# Patient Record
Sex: MALE | Race: WHITE | NOT HISPANIC OR LATINO | Employment: OTHER | URBAN - METROPOLITAN AREA
[De-identification: names, ages, dates, MRNs, and addresses within clinical notes are randomized per-mention and may not be internally consistent; named-entity substitution may affect disease eponyms.]

---

## 2017-07-31 ENCOUNTER — PREPPED CHART (OUTPATIENT)
Dept: URBAN - METROPOLITAN AREA CLINIC 35 | Facility: CLINIC | Age: 77
End: 2017-07-31

## 2017-09-21 ASSESSMENT — TONOMETRY
OD_IOP_MMHG: 13
OS_IOP_MMHG: 09

## 2017-09-21 ASSESSMENT — VISUAL ACUITY
OD_CC: 20/30-2
OS_CC: J3
OD_CC: J3
OS_CC: 20/30-2

## 2017-09-22 ENCOUNTER — ESTABLISHED PATIENT (OUTPATIENT)
Dept: URBAN - METROPOLITAN AREA CLINIC 35 | Facility: CLINIC | Age: 77
End: 2017-09-22

## 2017-09-22 DIAGNOSIS — H43.813: ICD-10-CM

## 2017-09-22 DIAGNOSIS — E11.9: ICD-10-CM

## 2017-09-22 DIAGNOSIS — H35.3122: ICD-10-CM

## 2017-09-22 DIAGNOSIS — H35.3112: ICD-10-CM

## 2017-09-22 PROCEDURE — 92012 INTRM OPH EXAM EST PATIENT: CPT

## 2017-09-22 PROCEDURE — 92250 FUNDUS PHOTOGRAPHY W/I&R: CPT

## 2017-09-22 PROCEDURE — 3072F LOW RISK FOR RETINOPATHY: CPT

## 2017-09-22 PROCEDURE — G8427 DOCREV CUR MEDS BY ELIG CLIN: HCPCS

## 2017-09-22 PROCEDURE — G8756 NO BP MEASURE DOC: HCPCS

## 2017-09-22 PROCEDURE — 2019F DILATED MACUL EXAM DONE: CPT

## 2017-09-22 PROCEDURE — 9222650 BILAT EXTENDED OPHTHALMOSCOPY, F/U

## 2017-09-22 PROCEDURE — 2022F DILAT RTA XM EVC RTNOPTHY: CPT

## 2017-09-22 PROCEDURE — 92134 CPTRZ OPH DX IMG PST SGM RTA: CPT

## 2017-09-22 PROCEDURE — 4177F TALK PT/CRGVR RE AREDS PREV: CPT

## 2017-09-22 PROCEDURE — 1036F TOBACCO NON-USER: CPT

## 2017-09-22 ASSESSMENT — VISUAL ACUITY
OS_CC: 20/30-2
OD_CC: 20/25+2

## 2017-09-22 ASSESSMENT — TONOMETRY
OD_IOP_MMHG: 13
OS_IOP_MMHG: 12

## 2018-01-11 ENCOUNTER — ESTABLISHED PATIENT (OUTPATIENT)
Dept: URBAN - METROPOLITAN AREA CLINIC 35 | Facility: CLINIC | Age: 78
End: 2018-01-11

## 2018-01-11 DIAGNOSIS — E11.9: ICD-10-CM

## 2018-01-11 DIAGNOSIS — H43.813: ICD-10-CM

## 2018-01-11 DIAGNOSIS — H35.3122: ICD-10-CM

## 2018-01-11 DIAGNOSIS — H35.3211: ICD-10-CM

## 2018-01-11 DIAGNOSIS — H25.013: ICD-10-CM

## 2018-01-11 PROCEDURE — 92226 OPHTHALMOSCOPY (SUB): CPT

## 2018-01-11 PROCEDURE — 1036F TOBACCO NON-USER: CPT

## 2018-01-11 PROCEDURE — 2024F 7 FLD RTA PHOTO EVC RTNOPTHY: CPT

## 2018-01-11 PROCEDURE — G8756 NO BP MEASURE DOC: HCPCS

## 2018-01-11 PROCEDURE — G8428 CUR MEDS NOT DOCUMENT: HCPCS

## 2018-01-11 PROCEDURE — 2019F DILATED MACUL EXAM DONE: CPT

## 2018-01-11 PROCEDURE — 4040F PNEUMOC VAC/ADMIN/RCVD: CPT

## 2018-01-11 PROCEDURE — 92014 COMPRE OPH EXAM EST PT 1/>: CPT

## 2018-01-11 PROCEDURE — G8482 FLU IMMUNIZE ORDER/ADMIN: HCPCS

## 2018-01-11 PROCEDURE — 3072F LOW RISK FOR RETINOPATHY: CPT

## 2018-01-11 PROCEDURE — 2022F DILAT RTA XM EVC RTNOPTHY: CPT

## 2018-01-11 PROCEDURE — 2026F EYE IMG VALID EVC RTNOPTHY: CPT

## 2018-01-11 PROCEDURE — G9744 PT NOT ELI D/T ACT DIG HTN: HCPCS

## 2018-01-11 PROCEDURE — 67028 INJECTION EYE DRUG: CPT

## 2018-01-11 PROCEDURE — 92134 CPTRZ OPH DX IMG PST SGM RTA: CPT

## 2018-01-11 PROCEDURE — 4177F TALK PT/CRGVR RE AREDS PREV: CPT

## 2018-01-11 ASSESSMENT — VISUAL ACUITY
OD_CC: 20/40
OS_CC: 20/40

## 2018-01-11 ASSESSMENT — TONOMETRY
OD_IOP_MMHG: 15
OS_IOP_MMHG: 14

## 2018-02-15 ENCOUNTER — ESTABLISHED PATIENT (OUTPATIENT)
Dept: URBAN - METROPOLITAN AREA CLINIC 35 | Facility: CLINIC | Age: 78
End: 2018-02-15

## 2018-02-15 DIAGNOSIS — H35.722: ICD-10-CM

## 2018-02-15 DIAGNOSIS — H35.3211: ICD-10-CM

## 2018-02-15 DIAGNOSIS — H35.3122: ICD-10-CM

## 2018-02-15 DIAGNOSIS — H35.731: ICD-10-CM

## 2018-02-15 PROCEDURE — G8428 CUR MEDS NOT DOCUMENT: HCPCS

## 2018-02-15 PROCEDURE — 2019F DILATED MACUL EXAM DONE: CPT

## 2018-02-15 PROCEDURE — 4177F TALK PT/CRGVR RE AREDS PREV: CPT

## 2018-02-15 PROCEDURE — 92012 INTRM OPH EXAM EST PATIENT: CPT

## 2018-02-15 PROCEDURE — G8756 NO BP MEASURE DOC: HCPCS

## 2018-02-15 PROCEDURE — 1036F TOBACCO NON-USER: CPT

## 2018-02-15 PROCEDURE — 67028 INJECTION EYE DRUG: CPT

## 2018-02-15 PROCEDURE — 92134 CPTRZ OPH DX IMG PST SGM RTA: CPT

## 2018-02-15 ASSESSMENT — VISUAL ACUITY
OD_CC: 20/60+2
OS_CC: 20/40

## 2018-02-15 ASSESSMENT — TONOMETRY
OS_IOP_MMHG: 12
OD_IOP_MMHG: 15

## 2018-02-16 NOTE — PATIENT DISCUSSION
New Prescription: Lotemax (loteprednol etabonate): ointment: 0.5% a small amount at bedtime into both eyes 02-

## 2018-02-16 NOTE — PATIENT DISCUSSION
K SIC[facility]: PRESCRIBED LOTEMAX MAYCOL QHS  DISAPPEARING PRESERVATIVE OR PRESERVATIVE FREE ARTIFICIAL TEARS BID ? QID4-6X A DAY, OU AND THE DAILY INTAKE OF OMEGA-3 DHA/EPA FATTY ACIDS TO HELP RELIEVE SYMPTOMS. ADD NIGHTLY LUBRICATING OINTMENT OR GEL. CONTINUE  RESTASIS BID . CONSIDER PUNCTAL PLUGS  NEXT VISIT  RETURN FOR FOLLOW-UP AS SCHEDULED OR SOONER IF SYMPTOMS WORSEN.

## 2018-03-15 ENCOUNTER — ESTABLISHED PATIENT (OUTPATIENT)
Dept: URBAN - METROPOLITAN AREA CLINIC 35 | Facility: CLINIC | Age: 78
End: 2018-03-15

## 2018-03-15 DIAGNOSIS — H35.3122: ICD-10-CM

## 2018-03-15 DIAGNOSIS — H35.3211: ICD-10-CM

## 2018-03-15 DIAGNOSIS — H35.731: ICD-10-CM

## 2018-03-15 DIAGNOSIS — H35.722: ICD-10-CM

## 2018-03-15 PROCEDURE — 4177F TALK PT/CRGVR RE AREDS PREV: CPT

## 2018-03-15 PROCEDURE — 92012 INTRM OPH EXAM EST PATIENT: CPT

## 2018-03-15 PROCEDURE — 92134 CPTRZ OPH DX IMG PST SGM RTA: CPT

## 2018-03-15 PROCEDURE — 2019F DILATED MACUL EXAM DONE: CPT

## 2018-03-15 PROCEDURE — G8756 NO BP MEASURE DOC: HCPCS

## 2018-03-15 PROCEDURE — G8428 CUR MEDS NOT DOCUMENT: HCPCS

## 2018-03-15 PROCEDURE — 9222650 BILAT EXTENDED OPHTHALMOSCOPY, F/U

## 2018-03-15 PROCEDURE — 67028 INJECTION EYE DRUG: CPT

## 2018-03-15 PROCEDURE — 1036F TOBACCO NON-USER: CPT

## 2018-03-15 ASSESSMENT — TONOMETRY
OS_IOP_MMHG: 13
OD_IOP_MMHG: 10

## 2018-03-15 ASSESSMENT — VISUAL ACUITY
OS_CC: 20/40
OD_CC: 20/50+1

## 2018-04-03 NOTE — PATIENT DISCUSSION
CONTINUE RESTASIS BID,  PRESERVATIVE FREE ARTIFICIAL TEARS BID ? QID4-6X A DAY, OU AND THE DAILY INTAKE OF OMEGA-3 DHA/EPA FATTY ACIDS TO HELP RELIEVE SYMPTOMS. ADD NIGHTLY LUBRICATING OINTMENT OR GEL.

## 2018-04-03 NOTE — PATIENT DISCUSSION
Continue: Refresh Classic (PF) (polyvinyl alcohol-povidon(pf)): dropperette: 1.4-0.6% 1 drop four times a day as needed into both eyes

## 2018-04-26 ENCOUNTER — ESTABLISHED PATIENT (OUTPATIENT)
Dept: URBAN - METROPOLITAN AREA CLINIC 35 | Facility: CLINIC | Age: 78
End: 2018-04-26

## 2018-04-26 DIAGNOSIS — H35.30: ICD-10-CM

## 2018-04-26 DIAGNOSIS — H35.731: ICD-10-CM

## 2018-04-26 DIAGNOSIS — H35.722: ICD-10-CM

## 2018-04-26 DIAGNOSIS — H43.813: ICD-10-CM

## 2018-04-26 DIAGNOSIS — H35.3211: ICD-10-CM

## 2018-04-26 DIAGNOSIS — H35.363: ICD-10-CM

## 2018-04-26 DIAGNOSIS — E11.9: ICD-10-CM

## 2018-04-26 DIAGNOSIS — H35.3122: ICD-10-CM

## 2018-04-26 PROCEDURE — 92235 FLUORESCEIN ANGRPH MLTIFRAME: CPT

## 2018-04-26 PROCEDURE — 67028 INJECTION EYE DRUG: CPT

## 2018-04-26 PROCEDURE — 1036F TOBACCO NON-USER: CPT

## 2018-04-26 PROCEDURE — G8756 NO BP MEASURE DOC: HCPCS

## 2018-04-26 PROCEDURE — G8428 CUR MEDS NOT DOCUMENT: HCPCS

## 2018-04-26 PROCEDURE — 4177F TALK PT/CRGVR RE AREDS PREV: CPT

## 2018-04-26 PROCEDURE — 2019F DILATED MACUL EXAM DONE: CPT

## 2018-04-26 PROCEDURE — 92250 FUNDUS PHOTOGRAPHY W/I&R: CPT

## 2018-04-26 PROCEDURE — 92012 INTRM OPH EXAM EST PATIENT: CPT

## 2018-04-26 PROCEDURE — 9222650 BILAT EXTENDED OPHTHALMOSCOPY, F/U

## 2018-04-26 ASSESSMENT — TONOMETRY
OD_IOP_MMHG: 14
OS_IOP_MMHG: 14

## 2018-04-26 ASSESSMENT — VISUAL ACUITY
OD_CC: 20/50-1
OS_CC: 20/50-1

## 2018-06-14 ENCOUNTER — ESTABLISHED PATIENT (OUTPATIENT)
Dept: URBAN - METROPOLITAN AREA CLINIC 35 | Facility: CLINIC | Age: 78
End: 2018-06-14

## 2018-06-14 DIAGNOSIS — H35.3122: ICD-10-CM

## 2018-06-14 DIAGNOSIS — H35.3211: ICD-10-CM

## 2018-06-14 DIAGNOSIS — H35.722: ICD-10-CM

## 2018-06-14 DIAGNOSIS — E11.9: ICD-10-CM

## 2018-06-14 DIAGNOSIS — H35.363: ICD-10-CM

## 2018-06-14 DIAGNOSIS — H35.731: ICD-10-CM

## 2018-06-14 DIAGNOSIS — H43.813: ICD-10-CM

## 2018-06-14 PROCEDURE — G8427 DOCREV CUR MEDS BY ELIG CLIN: HCPCS

## 2018-06-14 PROCEDURE — 2019F DILATED MACUL EXAM DONE: CPT

## 2018-06-14 PROCEDURE — 2022F DILAT RTA XM EVC RTNOPTHY: CPT

## 2018-06-14 PROCEDURE — 92012 INTRM OPH EXAM EST PATIENT: CPT

## 2018-06-14 PROCEDURE — G8756 NO BP MEASURE DOC: HCPCS

## 2018-06-14 PROCEDURE — 3072F LOW RISK FOR RETINOPATHY: CPT

## 2018-06-14 PROCEDURE — 67028 INJECTION EYE DRUG: CPT

## 2018-06-14 PROCEDURE — 4177F TALK PT/CRGVR RE AREDS PREV: CPT

## 2018-06-14 PROCEDURE — 1036F TOBACCO NON-USER: CPT

## 2018-06-14 PROCEDURE — 92134 CPTRZ OPH DX IMG PST SGM RTA: CPT

## 2018-06-14 PROCEDURE — 9222650 BILAT EXTENDED OPHTHALMOSCOPY, F/U

## 2018-06-14 ASSESSMENT — TONOMETRY
OD_IOP_MMHG: 16
OS_IOP_MMHG: 12

## 2018-06-14 ASSESSMENT — VISUAL ACUITY
OS_CC: 20/40
OD_CC: 20/50-2

## 2018-07-09 ENCOUNTER — CATARACT CONSULT (OUTPATIENT)
Dept: URBAN - METROPOLITAN AREA CLINIC 39 | Facility: CLINIC | Age: 78
End: 2018-07-09

## 2018-07-09 VITALS
SYSTOLIC BLOOD PRESSURE: 128 MMHG | DIASTOLIC BLOOD PRESSURE: 76 MMHG | HEIGHT: 60 IN | HEART RATE: 68 BPM | RESPIRATION RATE: 20 BRPM

## 2018-07-09 DIAGNOSIS — H35.3122: ICD-10-CM

## 2018-07-09 DIAGNOSIS — E11.9: ICD-10-CM

## 2018-07-09 DIAGNOSIS — H40.013: ICD-10-CM

## 2018-07-09 DIAGNOSIS — H35.3211: ICD-10-CM

## 2018-07-09 DIAGNOSIS — Z79.899: ICD-10-CM

## 2018-07-09 DIAGNOSIS — H35.731: ICD-10-CM

## 2018-07-09 DIAGNOSIS — H25.812: ICD-10-CM

## 2018-07-09 DIAGNOSIS — H35.722: ICD-10-CM

## 2018-07-09 DIAGNOSIS — H25.811: ICD-10-CM

## 2018-07-09 DIAGNOSIS — H18.51: ICD-10-CM

## 2018-07-09 DIAGNOSIS — H43.813: ICD-10-CM

## 2018-07-09 PROCEDURE — G8754 DIAS BP LESS 90: HCPCS

## 2018-07-09 PROCEDURE — G8428 CUR MEDS NOT DOCUMENT: HCPCS

## 2018-07-09 PROCEDURE — 2022F DILAT RTA XM EVC RTNOPTHY: CPT

## 2018-07-09 PROCEDURE — 92286 ANT SGM IMG I&R SPECLR MIC: CPT

## 2018-07-09 PROCEDURE — G8482 FLU IMMUNIZE ORDER/ADMIN: HCPCS

## 2018-07-09 PROCEDURE — 99215 OFFICE O/P EST HI 40 MIN: CPT

## 2018-07-09 PROCEDURE — 4040F PNEUMOC VAC/ADMIN/RCVD: CPT

## 2018-07-09 PROCEDURE — G8752 SYS BP LESS 140: HCPCS

## 2018-07-09 PROCEDURE — 1036F TOBACCO NON-USER: CPT

## 2018-07-09 PROCEDURE — 4177F TALK PT/CRGVR RE AREDS PREV: CPT

## 2018-07-09 PROCEDURE — 92015 DETERMINE REFRACTIVE STATE: CPT

## 2018-07-09 PROCEDURE — 2019F DILATED MACUL EXAM DONE: CPT

## 2018-07-09 PROCEDURE — 3072F LOW RISK FOR RETINOPATHY: CPT

## 2018-07-09 PROCEDURE — 92134 CPTRZ OPH DX IMG PST SGM RTA: CPT

## 2018-07-09 PROCEDURE — 92136TC INTERFEROMETRY - TECHNICAL COMPONENT

## 2018-07-09 RX ORDER — PREDNISOLONE ACETATE 10 MG/ML: 1 SUSPENSION/ DROPS OPHTHALMIC

## 2018-07-09 RX ORDER — NEPAFENAC 3 MG/ML: 1 SUSPENSION/ DROPS OPHTHALMIC ONCE A DAY

## 2018-07-09 RX ORDER — MOXIFLOXACIN HYDROCHLORIDE 5 MG/ML: 1 SOLUTION/ DROPS OPHTHALMIC

## 2018-07-09 ASSESSMENT — TONOMETRY
OD_IOP_MMHG: 13
OS_IOP_MMHG: 14

## 2018-07-09 ASSESSMENT — VISUAL ACUITY
OD_BAT: 20/200
OS_BAT: 20/200
OD_CC: J6
OS_SC: <J10
OS_CC: J8
OD_SC: <J10
OD_SC: 20/70
OS_SC: 20/100
OS_CC: 20/40
OS_AM: 20/20-2
OD_CC: 20/50-1
OD_PAM: 20/20

## 2018-08-09 ENCOUNTER — ESTABLISHED PATIENT (OUTPATIENT)
Dept: URBAN - METROPOLITAN AREA CLINIC 35 | Facility: CLINIC | Age: 78
End: 2018-08-09

## 2018-08-09 DIAGNOSIS — Z79.899: ICD-10-CM

## 2018-08-09 DIAGNOSIS — H40.013: ICD-10-CM

## 2018-08-09 DIAGNOSIS — H35.3122: ICD-10-CM

## 2018-08-09 DIAGNOSIS — H43.813: ICD-10-CM

## 2018-08-09 DIAGNOSIS — H35.722: ICD-10-CM

## 2018-08-09 DIAGNOSIS — H35.3211: ICD-10-CM

## 2018-08-09 DIAGNOSIS — E11.9: ICD-10-CM

## 2018-08-09 DIAGNOSIS — H35.731: ICD-10-CM

## 2018-08-09 PROCEDURE — 92250 FUNDUS PHOTOGRAPHY W/I&R: CPT

## 2018-08-09 PROCEDURE — 9222650 BILAT EXTENDED OPHTHALMOSCOPY, F/U

## 2018-08-09 PROCEDURE — 92012 INTRM OPH EXAM EST PATIENT: CPT

## 2018-08-09 PROCEDURE — 3072F LOW RISK FOR RETINOPATHY: CPT

## 2018-08-09 PROCEDURE — G8756 NO BP MEASURE DOC: HCPCS

## 2018-08-09 PROCEDURE — 92275 ELECTRORETINOGRAPHY: CPT

## 2018-08-09 PROCEDURE — G9974 MAC EXAM PERF: HCPCS

## 2018-08-09 PROCEDURE — G8427 DOCREV CUR MEDS BY ELIG CLIN: HCPCS

## 2018-08-09 PROCEDURE — G9903 PT SCRN TBCO ID AS NON USER: HCPCS

## 2018-08-09 PROCEDURE — 4177F TALK PT/CRGVR RE AREDS PREV: CPT

## 2018-08-09 PROCEDURE — 2022F DILAT RTA XM EVC RTNOPTHY: CPT

## 2018-08-09 PROCEDURE — 1036F TOBACCO NON-USER: CPT

## 2018-08-09 PROCEDURE — 67028 INJECTION EYE DRUG: CPT

## 2018-08-09 PROCEDURE — 92235 FLUORESCEIN ANGRPH MLTIFRAME: CPT

## 2018-08-09 PROCEDURE — 2019F DILATED MACUL EXAM DONE: CPT

## 2018-08-09 ASSESSMENT — VISUAL ACUITY
OD_CC: 20/50
OS_CC: 20/50-1

## 2018-08-09 ASSESSMENT — TONOMETRY
OS_IOP_MMHG: 13
OD_IOP_MMHG: 16

## 2018-10-25 ENCOUNTER — ESTABLISHED PATIENT (OUTPATIENT)
Dept: URBAN - METROPOLITAN AREA CLINIC 35 | Facility: CLINIC | Age: 78
End: 2018-10-25

## 2018-10-25 DIAGNOSIS — E11.9: ICD-10-CM

## 2018-10-25 DIAGNOSIS — H35.3211: ICD-10-CM

## 2018-10-25 DIAGNOSIS — H35.722: ICD-10-CM

## 2018-10-25 DIAGNOSIS — H35.3122: ICD-10-CM

## 2018-10-25 DIAGNOSIS — H35.731: ICD-10-CM

## 2018-10-25 DIAGNOSIS — H43.813: ICD-10-CM

## 2018-10-25 PROCEDURE — G8482 FLU IMMUNIZE ORDER/ADMIN: HCPCS

## 2018-10-25 PROCEDURE — 2019F DILATED MACUL EXAM DONE: CPT

## 2018-10-25 PROCEDURE — G8756 NO BP MEASURE DOC: HCPCS

## 2018-10-25 PROCEDURE — 92134 CPTRZ OPH DX IMG PST SGM RTA: CPT

## 2018-10-25 PROCEDURE — 67028 INJECTION EYE DRUG: CPT

## 2018-10-25 PROCEDURE — G8428 CUR MEDS NOT DOCUMENT: HCPCS

## 2018-10-25 PROCEDURE — 2022F DILAT RTA XM EVC RTNOPTHY: CPT

## 2018-10-25 PROCEDURE — 1036F TOBACCO NON-USER: CPT

## 2018-10-25 PROCEDURE — 4177F TALK PT/CRGVR RE AREDS PREV: CPT

## 2018-10-25 PROCEDURE — G9903 PT SCRN TBCO ID AS NON USER: HCPCS

## 2018-10-25 PROCEDURE — 3072F LOW RISK FOR RETINOPATHY: CPT

## 2018-10-25 PROCEDURE — 4040F PNEUMOC VAC/ADMIN/RCVD: CPT

## 2018-10-25 PROCEDURE — 99214 OFFICE O/P EST MOD 30 MIN: CPT

## 2018-10-25 ASSESSMENT — TONOMETRY
OS_IOP_MMHG: 14
OD_IOP_MMHG: 16

## 2018-10-25 ASSESSMENT — VISUAL ACUITY
OS_CC: 20/40+1
OD_CC: 20/60+1

## 2019-01-17 ENCOUNTER — ESTABLISHED PATIENT (OUTPATIENT)
Dept: URBAN - METROPOLITAN AREA CLINIC 35 | Facility: CLINIC | Age: 79
End: 2019-01-17

## 2019-01-17 DIAGNOSIS — Z79.899: ICD-10-CM

## 2019-01-17 DIAGNOSIS — H43.813: ICD-10-CM

## 2019-01-17 DIAGNOSIS — H35.3122: ICD-10-CM

## 2019-01-17 DIAGNOSIS — E11.9: ICD-10-CM

## 2019-01-17 DIAGNOSIS — H35.731: ICD-10-CM

## 2019-01-17 DIAGNOSIS — H35.3211: ICD-10-CM

## 2019-01-17 DIAGNOSIS — H35.722: ICD-10-CM

## 2019-01-17 PROCEDURE — G8427 DOCREV CUR MEDS BY ELIG CLIN: HCPCS

## 2019-01-17 PROCEDURE — 92250 FUNDUS PHOTOGRAPHY W/I&R: CPT

## 2019-01-17 PROCEDURE — 3072F LOW RISK FOR RETINOPATHY: CPT

## 2019-01-17 PROCEDURE — 92134 CPTRZ OPH DX IMG PST SGM RTA: CPT

## 2019-01-17 PROCEDURE — 1036F TOBACCO NON-USER: CPT

## 2019-01-17 PROCEDURE — 92235 FLUORESCEIN ANGRPH MLTIFRAME: CPT

## 2019-01-17 PROCEDURE — G9903 PT SCRN TBCO ID AS NON USER: HCPCS

## 2019-01-17 PROCEDURE — 92012 INTRM OPH EXAM EST PATIENT: CPT

## 2019-01-17 PROCEDURE — G8756 NO BP MEASURE DOC: HCPCS

## 2019-01-17 PROCEDURE — 2019F DILATED MACUL EXAM DONE: CPT

## 2019-01-17 PROCEDURE — 2022F DILAT RTA XM EVC RTNOPTHY: CPT

## 2019-01-17 PROCEDURE — 4177F TALK PT/CRGVR RE AREDS PREV: CPT

## 2019-01-17 ASSESSMENT — TONOMETRY
OS_IOP_MMHG: 15
OD_IOP_MMHG: 15

## 2019-01-17 ASSESSMENT — VISUAL ACUITY
OD_CC: 20/50-1
OS_CC: 20/40-2

## 2019-04-05 NOTE — PATIENT DISCUSSION
K SICCA: CONTINUE RESTASIS BID, PRESERVATIVE FREE ARTIFICIAL TEARS 4-6X A DAY, OU AND THE DAILY INTAKE OF OMEGA-3 DHA/EPA FATTY ACIDS TO HELP RELIEVE SYMPTOMS. ADD NIGHTLY LUBRICATING OINTMENT OR GEL.

## 2019-04-18 ENCOUNTER — ESTABLISHED PATIENT (OUTPATIENT)
Dept: URBAN - METROPOLITAN AREA CLINIC 35 | Facility: CLINIC | Age: 79
End: 2019-04-18

## 2019-04-18 DIAGNOSIS — H35.731: ICD-10-CM

## 2019-04-18 DIAGNOSIS — H35.3122: ICD-10-CM

## 2019-04-18 DIAGNOSIS — H35.3212: ICD-10-CM

## 2019-04-18 DIAGNOSIS — H35.722: ICD-10-CM

## 2019-04-18 DIAGNOSIS — Z79.899: ICD-10-CM

## 2019-04-18 DIAGNOSIS — E11.9: ICD-10-CM

## 2019-04-18 DIAGNOSIS — H43.813: ICD-10-CM

## 2019-04-18 DIAGNOSIS — H40.013: ICD-10-CM

## 2019-04-18 PROCEDURE — 9222650 BILAT EXTENDED OPHTHALMOSCOPY, F/U

## 2019-04-18 PROCEDURE — 92250 FUNDUS PHOTOGRAPHY W/I&R: CPT

## 2019-04-18 PROCEDURE — 92134 CPTRZ OPH DX IMG PST SGM RTA: CPT

## 2019-04-18 PROCEDURE — 92014 COMPRE OPH EXAM EST PT 1/>: CPT

## 2019-04-18 PROCEDURE — 92235 FLUORESCEIN ANGRPH MLTIFRAME: CPT

## 2019-04-18 ASSESSMENT — TONOMETRY
OD_IOP_MMHG: 14
OS_IOP_MMHG: 11

## 2019-04-18 ASSESSMENT — VISUAL ACUITY
OS_CC: 20/40-1
OD_CC: 20/50-1

## 2019-04-23 ENCOUNTER — EST. PATIENT EMERGENCY (OUTPATIENT)
Dept: URBAN - METROPOLITAN AREA CLINIC 35 | Facility: CLINIC | Age: 79
End: 2019-04-23

## 2019-04-23 DIAGNOSIS — H35.731: ICD-10-CM

## 2019-04-23 DIAGNOSIS — H35.3212: ICD-10-CM

## 2019-04-23 DIAGNOSIS — H35.3122: ICD-10-CM

## 2019-04-23 DIAGNOSIS — Z79.899: ICD-10-CM

## 2019-04-23 DIAGNOSIS — E11.9: ICD-10-CM

## 2019-04-23 DIAGNOSIS — H35.722: ICD-10-CM

## 2019-04-23 PROCEDURE — 92250 FUNDUS PHOTOGRAPHY W/I&R: CPT

## 2019-04-23 PROCEDURE — 92014 COMPRE OPH EXAM EST PT 1/>: CPT

## 2019-04-23 ASSESSMENT — TONOMETRY
OD_IOP_MMHG: 14
OS_IOP_MMHG: 12

## 2019-04-23 ASSESSMENT — VISUAL ACUITY
OS_CC: 20/200-1
OD_CC: J4
OD_CC: 20/50+1
OS_CC: 20/400

## 2019-04-25 ENCOUNTER — ESTABLISHED PATIENT (OUTPATIENT)
Dept: URBAN - METROPOLITAN AREA CLINIC 35 | Facility: CLINIC | Age: 79
End: 2019-04-25

## 2019-04-25 DIAGNOSIS — H35.3122: ICD-10-CM

## 2019-04-25 DIAGNOSIS — Z79.899: ICD-10-CM

## 2019-04-25 DIAGNOSIS — H35.731: ICD-10-CM

## 2019-04-25 DIAGNOSIS — E11.9: ICD-10-CM

## 2019-04-25 DIAGNOSIS — H35.722: ICD-10-CM

## 2019-04-25 DIAGNOSIS — H43.813: ICD-10-CM

## 2019-04-25 DIAGNOSIS — H35.3212: ICD-10-CM

## 2019-04-25 PROCEDURE — 92134 CPTRZ OPH DX IMG PST SGM RTA: CPT

## 2019-04-25 PROCEDURE — 92014 COMPRE OPH EXAM EST PT 1/>: CPT

## 2019-04-25 ASSESSMENT — TONOMETRY
OD_IOP_MMHG: 18
OS_IOP_MMHG: 15

## 2019-04-25 ASSESSMENT — VISUAL ACUITY
OS_CC: 20/200
OD_CC: 20/50

## 2019-05-06 ENCOUNTER — CATARACT EVALUATION (OUTPATIENT)
Dept: URBAN - METROPOLITAN AREA CLINIC 39 | Facility: CLINIC | Age: 79
End: 2019-05-06

## 2019-05-06 DIAGNOSIS — H35.3212: ICD-10-CM

## 2019-05-06 DIAGNOSIS — E11.9: ICD-10-CM

## 2019-05-06 DIAGNOSIS — H25.812: ICD-10-CM

## 2019-05-06 DIAGNOSIS — H18.51: ICD-10-CM

## 2019-05-06 DIAGNOSIS — Z79.899: ICD-10-CM

## 2019-05-06 DIAGNOSIS — H25.811: ICD-10-CM

## 2019-05-06 DIAGNOSIS — H40.013: ICD-10-CM

## 2019-05-06 DIAGNOSIS — H35.3122: ICD-10-CM

## 2019-05-06 PROCEDURE — 92025-1 CORNEAL TOPOGRAPHY, INS

## 2019-05-06 PROCEDURE — V2799I IMPRIMIS PREDGATIBROM

## 2019-05-06 PROCEDURE — 92286 ANT SGM IMG I&R SPECLR MIC: CPT

## 2019-05-06 PROCEDURE — 92136TC INTERFEROMETRY - TECHNICAL COMPONENT

## 2019-05-06 PROCEDURE — 99214 OFFICE O/P EST MOD 30 MIN: CPT

## 2019-05-06 ASSESSMENT — VISUAL ACUITY
OS_CC: 20/60+1
OD_CC: 20/40-2
OS_SC: <J12
OD_BAT: 20/400
OD_SC: 20/70
OS_BAT: 20/400
OS_AM: 20/20
OS_SC: 20/100+1
OS_CC: J8
OD_CC: J6
OD_RAM: 20/20
OD_SC: J12

## 2019-05-06 ASSESSMENT — TONOMETRY
OD_IOP_MMHG: 21
OS_IOP_MMHG: 19

## 2019-05-14 ENCOUNTER — PRE-OP/H&P (OUTPATIENT)
Dept: URBAN - METROPOLITAN AREA CLINIC 39 | Facility: CLINIC | Age: 79
End: 2019-05-14

## 2019-05-14 ENCOUNTER — SURGERY/PROCEDURE (OUTPATIENT)
Dept: URBAN - METROPOLITAN AREA SURGERY 14 | Facility: SURGERY | Age: 79
End: 2019-05-14

## 2019-05-14 DIAGNOSIS — Z79.899: ICD-10-CM

## 2019-05-14 DIAGNOSIS — H43.813: ICD-10-CM

## 2019-05-14 DIAGNOSIS — H18.51: ICD-10-CM

## 2019-05-14 DIAGNOSIS — H35.722: ICD-10-CM

## 2019-05-14 DIAGNOSIS — H25.811: ICD-10-CM

## 2019-05-14 DIAGNOSIS — H25.812: ICD-10-CM

## 2019-05-14 DIAGNOSIS — H35.3212: ICD-10-CM

## 2019-05-14 DIAGNOSIS — H35.731: ICD-10-CM

## 2019-05-14 DIAGNOSIS — E11.9: ICD-10-CM

## 2019-05-14 DIAGNOSIS — H35.3122: ICD-10-CM

## 2019-05-14 DIAGNOSIS — H40.013: ICD-10-CM

## 2019-05-14 PROCEDURE — 66984 XCAPSL CTRC RMVL W/O ECP: CPT

## 2019-05-14 PROCEDURE — 99211T TECH SERVICE

## 2019-05-15 ENCOUNTER — POST OP/EVAL OF SECOND EYE (OUTPATIENT)
Dept: URBAN - METROPOLITAN AREA CLINIC 35 | Facility: CLINIC | Age: 79
End: 2019-05-15

## 2019-05-15 DIAGNOSIS — Z79.899: ICD-10-CM

## 2019-05-15 DIAGNOSIS — H18.51: ICD-10-CM

## 2019-05-15 DIAGNOSIS — Z96.1: ICD-10-CM

## 2019-05-15 PROCEDURE — 99024 POSTOP FOLLOW-UP VISIT: CPT

## 2019-05-15 ASSESSMENT — VISUAL ACUITY
OD_CC: J7
OS_SC: J7
OD_CC: 20/50
OS_PH: 20/40
OS_SC: 20/70
OD_SC: 20/70

## 2019-05-15 ASSESSMENT — TONOMETRY
OD_IOP_MMHG: 18
OS_IOP_MMHG: 20

## 2019-05-21 ENCOUNTER — PRE-OP/H&P (OUTPATIENT)
Dept: URBAN - METROPOLITAN AREA CLINIC 39 | Facility: CLINIC | Age: 79
End: 2019-05-21

## 2019-05-21 ENCOUNTER — SURGERY/PROCEDURE (OUTPATIENT)
Dept: URBAN - METROPOLITAN AREA SURGERY 14 | Facility: SURGERY | Age: 79
End: 2019-05-21

## 2019-05-21 DIAGNOSIS — Z79.899: ICD-10-CM

## 2019-05-21 DIAGNOSIS — H18.51: ICD-10-CM

## 2019-05-21 DIAGNOSIS — H25.812: ICD-10-CM

## 2019-05-21 DIAGNOSIS — H25.811: ICD-10-CM

## 2019-05-21 DIAGNOSIS — Z96.1: ICD-10-CM

## 2019-05-21 PROCEDURE — 99211T TECH SERVICE

## 2019-05-21 PROCEDURE — 66984 XCAPSL CTRC RMVL W/O ECP: CPT

## 2019-05-21 ASSESSMENT — VISUAL ACUITY
OS_SC: J12
OS_SC: 20/100
OD_SC: J12
OD_SC: 20/70

## 2019-05-21 ASSESSMENT — TONOMETRY
OS_IOP_MMHG: 15
OD_IOP_MMHG: 15

## 2019-05-22 ENCOUNTER — CATARACT POST-OP 1-DAY (OUTPATIENT)
Dept: URBAN - METROPOLITAN AREA CLINIC 35 | Facility: CLINIC | Age: 79
End: 2019-05-22

## 2019-05-22 DIAGNOSIS — Z79.899: ICD-10-CM

## 2019-05-22 DIAGNOSIS — Z96.1: ICD-10-CM

## 2019-05-22 DIAGNOSIS — H18.51: ICD-10-CM

## 2019-05-22 DIAGNOSIS — H25.811: ICD-10-CM

## 2019-05-22 PROCEDURE — 99024 POSTOP FOLLOW-UP VISIT: CPT

## 2019-05-22 ASSESSMENT — VISUAL ACUITY
OD_SC: 20/70-1
OS_SC: 20/70+1
OS_PH: 20/50

## 2019-05-22 ASSESSMENT — TONOMETRY
OD_IOP_MMHG: 20
OS_IOP_MMHG: 20

## 2019-06-10 ENCOUNTER — POST-OP CATARACT (OUTPATIENT)
Dept: URBAN - METROPOLITAN AREA CLINIC 35 | Facility: CLINIC | Age: 79
End: 2019-06-10

## 2019-06-10 DIAGNOSIS — Z96.1: ICD-10-CM

## 2019-06-10 DIAGNOSIS — H25.811: ICD-10-CM

## 2019-06-10 PROCEDURE — 99024 POSTOP FOLLOW-UP VISIT: CPT

## 2019-06-10 ASSESSMENT — TONOMETRY
OD_IOP_MMHG: 15
OS_IOP_MMHG: 14

## 2019-06-10 ASSESSMENT — VISUAL ACUITY
OD_SC: 20/80-1
OS_PH: 20/30
OD_PH: 20/60
OS_SC: 20/80-1

## 2019-06-24 ENCOUNTER — POST-OP CATARACT (OUTPATIENT)
Dept: URBAN - METROPOLITAN AREA CLINIC 35 | Facility: CLINIC | Age: 79
End: 2019-06-24

## 2019-06-24 DIAGNOSIS — Z96.1: ICD-10-CM

## 2019-06-24 PROCEDURE — 99024 POSTOP FOLLOW-UP VISIT: CPT

## 2019-06-24 ASSESSMENT — VISUAL ACUITY
OD_SC: 20/80+1
OD_SC: J5
OS_SC: 20/80+1
OS_SC: J10

## 2019-06-24 ASSESSMENT — TONOMETRY
OS_IOP_MMHG: 14
OD_IOP_MMHG: 14

## 2019-08-22 ENCOUNTER — FOLLOW UP (OUTPATIENT)
Dept: URBAN - METROPOLITAN AREA CLINIC 35 | Facility: CLINIC | Age: 79
End: 2019-08-22

## 2019-08-22 DIAGNOSIS — H35.3122: ICD-10-CM

## 2019-08-22 DIAGNOSIS — H35.722: ICD-10-CM

## 2019-08-22 DIAGNOSIS — H35.3212: ICD-10-CM

## 2019-08-22 DIAGNOSIS — E11.9: ICD-10-CM

## 2019-08-22 DIAGNOSIS — H35.731: ICD-10-CM

## 2019-08-22 DIAGNOSIS — H43.813: ICD-10-CM

## 2019-08-22 DIAGNOSIS — H35.351: ICD-10-CM

## 2019-08-22 PROCEDURE — 92014 COMPRE OPH EXAM EST PT 1/>: CPT

## 2019-08-22 PROCEDURE — 92134 CPTRZ OPH DX IMG PST SGM RTA: CPT

## 2019-08-22 PROCEDURE — 92235 FLUORESCEIN ANGRPH MLTIFRAME: CPT

## 2019-08-22 PROCEDURE — 9222650 BILAT EXTENDED OPHTHALMOSCOPY, F/U

## 2019-08-22 PROCEDURE — 92250 FUNDUS PHOTOGRAPHY W/I&R: CPT

## 2019-08-22 RX ORDER — PREDNISOLONE ACETATE 10 MG/ML: 1 SUSPENSION/ DROPS OPHTHALMIC

## 2019-08-22 ASSESSMENT — TONOMETRY
OS_IOP_MMHG: 14
OD_IOP_MMHG: 13

## 2019-08-22 ASSESSMENT — VISUAL ACUITY
OD_SC: 20/50
OS_SC: 20/80

## 2019-09-25 ENCOUNTER — FOLLOW UP (OUTPATIENT)
Dept: URBAN - METROPOLITAN AREA CLINIC 39 | Facility: CLINIC | Age: 79
End: 2019-09-25

## 2019-09-25 DIAGNOSIS — H35.3212: ICD-10-CM

## 2019-09-25 DIAGNOSIS — H35.3122: ICD-10-CM

## 2019-09-25 PROCEDURE — 92134 CPTRZ OPH DX IMG PST SGM RTA: CPT

## 2019-09-25 PROCEDURE — 92012 INTRM OPH EXAM EST PATIENT: CPT

## 2019-09-25 ASSESSMENT — TONOMETRY
OD_IOP_MMHG: 10
OS_IOP_MMHG: 12

## 2019-09-25 ASSESSMENT — VISUAL ACUITY
OD_SC: 20/60
OS_SC: 20/200

## 2019-12-26 ENCOUNTER — ESTABLISHED COMPREHENSIVE EXAM (OUTPATIENT)
Dept: URBAN - METROPOLITAN AREA CLINIC 35 | Facility: CLINIC | Age: 79
End: 2019-12-26

## 2019-12-26 DIAGNOSIS — H35.722: ICD-10-CM

## 2019-12-26 DIAGNOSIS — H43.813: ICD-10-CM

## 2019-12-26 DIAGNOSIS — H35.351: ICD-10-CM

## 2019-12-26 DIAGNOSIS — H35.3122: ICD-10-CM

## 2019-12-26 DIAGNOSIS — H35.731: ICD-10-CM

## 2019-12-26 DIAGNOSIS — E11.9: ICD-10-CM

## 2019-12-26 DIAGNOSIS — H35.3212: ICD-10-CM

## 2019-12-26 PROCEDURE — 92250 FUNDUS PHOTOGRAPHY W/I&R: CPT

## 2019-12-26 PROCEDURE — 9222650 BILAT EXTENDED OPHTHALMOSCOPY, F/U

## 2019-12-26 PROCEDURE — 92134 CPTRZ OPH DX IMG PST SGM RTA: CPT

## 2019-12-26 PROCEDURE — 92014 COMPRE OPH EXAM EST PT 1/>: CPT

## 2019-12-26 ASSESSMENT — VISUAL ACUITY
OD_SC: 20/60-1
OS_SC: 20/100

## 2019-12-26 ASSESSMENT — TONOMETRY
OD_IOP_MMHG: 10
OS_IOP_MMHG: 12

## 2020-06-25 ENCOUNTER — ESTABLISHED COMPREHENSIVE EXAM (OUTPATIENT)
Dept: URBAN - METROPOLITAN AREA CLINIC 35 | Facility: CLINIC | Age: 80
End: 2020-06-25

## 2020-06-25 DIAGNOSIS — E11.9: ICD-10-CM

## 2020-06-25 DIAGNOSIS — H35.3122: ICD-10-CM

## 2020-06-25 DIAGNOSIS — H35.3212: ICD-10-CM

## 2020-06-25 DIAGNOSIS — H43.813: ICD-10-CM

## 2020-06-25 DIAGNOSIS — H35.722: ICD-10-CM

## 2020-06-25 DIAGNOSIS — H35.351: ICD-10-CM

## 2020-06-25 DIAGNOSIS — H35.731: ICD-10-CM

## 2020-06-25 PROCEDURE — 92134 CPTRZ OPH DX IMG PST SGM RTA: CPT

## 2020-06-25 PROCEDURE — 92014 COMPRE OPH EXAM EST PT 1/>: CPT

## 2020-06-25 PROCEDURE — 92250 FUNDUS PHOTOGRAPHY W/I&R: CPT

## 2020-06-25 ASSESSMENT — VISUAL ACUITY
OS_PH: 20/70+2
OS_CC: 20/200+1
OD_CC: 20/40-1

## 2020-07-13 ENCOUNTER — ESTABLISHED COMPREHENSIVE EXAM (OUTPATIENT)
Dept: URBAN - METROPOLITAN AREA CLINIC 39 | Facility: CLINIC | Age: 80
End: 2020-07-13

## 2020-07-13 DIAGNOSIS — H35.3212: ICD-10-CM

## 2020-07-13 DIAGNOSIS — H35.3122: ICD-10-CM

## 2020-07-13 PROCEDURE — 92134 CPTRZ OPH DX IMG PST SGM RTA: CPT

## 2020-07-13 PROCEDURE — 92201 OPSCPY EXTND RTA DRAW UNI/BI: CPT

## 2020-07-13 PROCEDURE — 92012 INTRM OPH EXAM EST PATIENT: CPT

## 2020-07-13 ASSESSMENT — TONOMETRY
OD_IOP_MMHG: 16
OS_IOP_MMHG: 17

## 2020-07-13 ASSESSMENT — VISUAL ACUITY
OS_CC: 20/40-2
OD_CC: 20/50

## 2020-08-27 ENCOUNTER — DILATED FUNDUS EXAM (OUTPATIENT)
Dept: URBAN - METROPOLITAN AREA CLINIC 35 | Facility: CLINIC | Age: 80
End: 2020-08-27

## 2020-08-27 DIAGNOSIS — H35.3212: ICD-10-CM

## 2020-08-27 DIAGNOSIS — H35.3122: ICD-10-CM

## 2020-08-27 DIAGNOSIS — H43.813: ICD-10-CM

## 2020-08-27 DIAGNOSIS — H35.363: ICD-10-CM

## 2020-08-27 DIAGNOSIS — H35.351: ICD-10-CM

## 2020-08-27 DIAGNOSIS — E11.9: ICD-10-CM

## 2020-08-27 DIAGNOSIS — H35.722: ICD-10-CM

## 2020-08-27 DIAGNOSIS — H35.731: ICD-10-CM

## 2020-08-27 PROCEDURE — 92250 FUNDUS PHOTOGRAPHY W/I&R: CPT

## 2020-08-27 PROCEDURE — 92012 INTRM OPH EXAM EST PATIENT: CPT

## 2020-08-27 ASSESSMENT — VISUAL ACUITY
OD_CC: 20/40+1
OS_CC: 20/40

## 2020-08-27 ASSESSMENT — TONOMETRY
OS_IOP_MMHG: 13
OD_IOP_MMHG: 14

## 2020-09-15 ENCOUNTER — REFRACTION ONLY (OUTPATIENT)
Dept: URBAN - METROPOLITAN AREA CLINIC 35 | Facility: CLINIC | Age: 80
End: 2020-09-15

## 2020-09-15 DIAGNOSIS — H52.4: ICD-10-CM

## 2020-09-15 DIAGNOSIS — E11.9: ICD-10-CM

## 2020-09-15 DIAGNOSIS — H52.03: ICD-10-CM

## 2020-09-15 PROCEDURE — 92015 DETERMINE REFRACTIVE STATE: CPT

## 2020-09-15 PROCEDURE — 92012 INTRM OPH EXAM EST PATIENT: CPT

## 2020-09-15 ASSESSMENT — VISUAL ACUITY
OU_CC: 20/25-2
OD_SC: 20/70-1
OD_CC: 20/30-2
OU_SC: 20/70-1
OS_SC: J12
OD_CC: J3
OU_CC: J1
OS_CC: J2
OD_SC: J10
OU_SC: J10
OS_SC: 20/70-1
OS_CC: 20/25-2

## 2020-09-15 ASSESSMENT — TONOMETRY
OS_IOP_MMHG: 14
OD_IOP_MMHG: 14

## 2020-12-17 ENCOUNTER — ESTABLISHED PATIENT (OUTPATIENT)
Dept: URBAN - METROPOLITAN AREA CLINIC 35 | Facility: CLINIC | Age: 80
End: 2020-12-17

## 2020-12-17 DIAGNOSIS — E11.9: ICD-10-CM

## 2020-12-17 DIAGNOSIS — H35.3212: ICD-10-CM

## 2020-12-17 DIAGNOSIS — H43.813: ICD-10-CM

## 2020-12-17 DIAGNOSIS — H35.363: ICD-10-CM

## 2020-12-17 DIAGNOSIS — H35.351: ICD-10-CM

## 2020-12-17 DIAGNOSIS — H35.3122: ICD-10-CM

## 2020-12-17 DIAGNOSIS — H35.30: ICD-10-CM

## 2020-12-17 PROCEDURE — 92250 FUNDUS PHOTOGRAPHY W/I&R: CPT

## 2020-12-17 PROCEDURE — 92014 COMPRE OPH EXAM EST PT 1/>: CPT

## 2020-12-17 ASSESSMENT — TONOMETRY
OD_IOP_MMHG: 13
OS_IOP_MMHG: 15

## 2020-12-17 ASSESSMENT — VISUAL ACUITY
OS_CC: 20/40-1
OD_CC: 20/40-1

## 2021-03-25 ENCOUNTER — ESTABLISHED PATIENT (OUTPATIENT)
Dept: URBAN - METROPOLITAN AREA CLINIC 35 | Facility: CLINIC | Age: 81
End: 2021-03-25

## 2021-03-25 DIAGNOSIS — H35.363: ICD-10-CM

## 2021-03-25 DIAGNOSIS — H43.813: ICD-10-CM

## 2021-03-25 DIAGNOSIS — H35.033: ICD-10-CM

## 2021-03-25 DIAGNOSIS — H35.722: ICD-10-CM

## 2021-03-25 DIAGNOSIS — H35.3212: ICD-10-CM

## 2021-03-25 DIAGNOSIS — E11.9: ICD-10-CM

## 2021-03-25 DIAGNOSIS — H35.351: ICD-10-CM

## 2021-03-25 DIAGNOSIS — H35.731: ICD-10-CM

## 2021-03-25 DIAGNOSIS — H35.3122: ICD-10-CM

## 2021-03-25 PROCEDURE — 99214 OFFICE O/P EST MOD 30 MIN: CPT

## 2021-03-25 PROCEDURE — 92250 FUNDUS PHOTOGRAPHY W/I&R: CPT

## 2021-03-25 ASSESSMENT — VISUAL ACUITY
OD_CC: 20/60
OS_CC: 20/30-2

## 2021-03-25 ASSESSMENT — TONOMETRY
OD_IOP_MMHG: 15
OS_IOP_MMHG: 14

## 2021-08-26 ENCOUNTER — ESTABLISHED COMPREHENSIVE EXAM (OUTPATIENT)
Dept: URBAN - METROPOLITAN AREA CLINIC 35 | Facility: CLINIC | Age: 81
End: 2021-08-26

## 2021-08-26 DIAGNOSIS — H35.722: ICD-10-CM

## 2021-08-26 DIAGNOSIS — E11.9: ICD-10-CM

## 2021-08-26 DIAGNOSIS — H25.812: ICD-10-CM

## 2021-08-26 DIAGNOSIS — H35.3212: ICD-10-CM

## 2021-08-26 DIAGNOSIS — Z79.899: ICD-10-CM

## 2021-08-26 DIAGNOSIS — H35.731: ICD-10-CM

## 2021-08-26 DIAGNOSIS — H40.013: ICD-10-CM

## 2021-08-26 DIAGNOSIS — H43.813: ICD-10-CM

## 2021-08-26 DIAGNOSIS — H35.351: ICD-10-CM

## 2021-08-26 DIAGNOSIS — Z96.1: ICD-10-CM

## 2021-08-26 DIAGNOSIS — H35.363: ICD-10-CM

## 2021-08-26 DIAGNOSIS — H35.033: ICD-10-CM

## 2021-08-26 DIAGNOSIS — H35.3122: ICD-10-CM

## 2021-08-26 PROCEDURE — 99214 OFFICE O/P EST MOD 30 MIN: CPT

## 2021-08-26 PROCEDURE — 92250 FUNDUS PHOTOGRAPHY W/I&R: CPT

## 2021-08-26 PROCEDURE — 92273 FULL FIELD ERG W/I&R: CPT

## 2021-08-26 ASSESSMENT — VISUAL ACUITY
OD_PH: 20/60
OD_CC: 20/60-2
OS_CC: 20/25-2

## 2021-08-26 ASSESSMENT — TONOMETRY
OD_IOP_MMHG: 10
OS_IOP_MMHG: 09

## 2021-12-02 ENCOUNTER — EST. PATIENT EMERGENCY (OUTPATIENT)
Dept: URBAN - METROPOLITAN AREA CLINIC 35 | Facility: CLINIC | Age: 81
End: 2021-12-02

## 2021-12-02 DIAGNOSIS — H35.731: ICD-10-CM

## 2021-12-02 DIAGNOSIS — H35.3122: ICD-10-CM

## 2021-12-02 DIAGNOSIS — H35.033: ICD-10-CM

## 2021-12-02 DIAGNOSIS — E11.9: ICD-10-CM

## 2021-12-02 DIAGNOSIS — H35.3212: ICD-10-CM

## 2021-12-02 DIAGNOSIS — H35.351: ICD-10-CM

## 2021-12-02 DIAGNOSIS — H43.813: ICD-10-CM

## 2021-12-02 DIAGNOSIS — H35.722: ICD-10-CM

## 2021-12-02 PROCEDURE — 92134 CPTRZ OPH DX IMG PST SGM RTA: CPT

## 2021-12-02 PROCEDURE — 99214 OFFICE O/P EST MOD 30 MIN: CPT

## 2021-12-02 ASSESSMENT — VISUAL ACUITY
OS_CC: 20/40-2
OD_CC: 20/50-2

## 2021-12-02 ASSESSMENT — TONOMETRY
OD_IOP_MMHG: 14
OS_IOP_MMHG: 14

## 2022-01-06 ENCOUNTER — COMPREHENSIVE EXAM (OUTPATIENT)
Dept: URBAN - METROPOLITAN AREA CLINIC 35 | Facility: CLINIC | Age: 82
End: 2022-01-06

## 2022-01-06 DIAGNOSIS — H35.3122: ICD-10-CM

## 2022-01-06 DIAGNOSIS — H35.722: ICD-10-CM

## 2022-01-06 DIAGNOSIS — H43.813: ICD-10-CM

## 2022-01-06 DIAGNOSIS — H35.3212: ICD-10-CM

## 2022-01-06 DIAGNOSIS — Z79.899: ICD-10-CM

## 2022-01-06 DIAGNOSIS — H35.731: ICD-10-CM

## 2022-01-06 DIAGNOSIS — H35.033: ICD-10-CM

## 2022-01-06 DIAGNOSIS — E11.9: ICD-10-CM

## 2022-01-06 DIAGNOSIS — H35.363: ICD-10-CM

## 2022-01-06 PROCEDURE — 92250 FUNDUS PHOTOGRAPHY W/I&R: CPT

## 2022-01-06 PROCEDURE — 92273 FULL FIELD ERG W/I&R: CPT

## 2022-01-06 PROCEDURE — 99214 OFFICE O/P EST MOD 30 MIN: CPT

## 2022-01-06 ASSESSMENT — VISUAL ACUITY
OS_CC: 20/30-2
OD_CC: 20/50-1

## 2022-01-06 ASSESSMENT — TONOMETRY
OS_IOP_MMHG: 14
OD_IOP_MMHG: 16

## 2022-04-06 ENCOUNTER — COMPREHENSIVE EXAM (OUTPATIENT)
Dept: URBAN - METROPOLITAN AREA CLINIC 35 | Facility: CLINIC | Age: 82
End: 2022-04-06

## 2022-04-06 DIAGNOSIS — H35.3212: ICD-10-CM

## 2022-04-06 DIAGNOSIS — H35.722: ICD-10-CM

## 2022-04-06 DIAGNOSIS — H35.3122: ICD-10-CM

## 2022-04-06 DIAGNOSIS — H43.813: ICD-10-CM

## 2022-04-06 DIAGNOSIS — H52.4: ICD-10-CM

## 2022-04-06 DIAGNOSIS — H35.731: ICD-10-CM

## 2022-04-06 DIAGNOSIS — H40.013: ICD-10-CM

## 2022-04-06 DIAGNOSIS — H35.033: ICD-10-CM

## 2022-04-06 DIAGNOSIS — E11.9: ICD-10-CM

## 2022-04-06 PROCEDURE — 92015 DETERMINE REFRACTIVE STATE: CPT

## 2022-04-06 PROCEDURE — 92012 INTRM OPH EXAM EST PATIENT: CPT

## 2022-04-06 PROCEDURE — 92134 CPTRZ OPH DX IMG PST SGM RTA: CPT

## 2022-04-06 ASSESSMENT — VISUAL ACUITY
OS_CC: 20/40
OD_CC: J12<
OD_CC: 20/50
OS_CC: J10

## 2022-04-06 ASSESSMENT — TONOMETRY
OS_IOP_MMHG: 12
OD_IOP_MMHG: 11

## 2022-10-13 ENCOUNTER — ESTABLISHED PATIENT (OUTPATIENT)
Dept: URBAN - METROPOLITAN AREA CLINIC 35 | Facility: CLINIC | Age: 82
End: 2022-10-13

## 2022-10-13 DIAGNOSIS — H40.013: ICD-10-CM

## 2022-10-13 DIAGNOSIS — H35.3212: ICD-10-CM

## 2022-10-13 DIAGNOSIS — H43.813: ICD-10-CM

## 2022-10-13 DIAGNOSIS — H35.731: ICD-10-CM

## 2022-10-13 DIAGNOSIS — E11.9: ICD-10-CM

## 2022-10-13 DIAGNOSIS — H35.3122: ICD-10-CM

## 2022-10-13 DIAGNOSIS — Z79.899: ICD-10-CM

## 2022-10-13 DIAGNOSIS — H35.033: ICD-10-CM

## 2022-10-13 DIAGNOSIS — H35.722: ICD-10-CM

## 2022-10-13 PROCEDURE — 92273 FULL FIELD ERG W/I&R: CPT

## 2022-10-13 PROCEDURE — 99214 OFFICE O/P EST MOD 30 MIN: CPT

## 2022-10-13 PROCEDURE — 92250 FUNDUS PHOTOGRAPHY W/I&R: CPT

## 2022-10-13 ASSESSMENT — VISUAL ACUITY
OS_CC: 20/50
OD_CC: 20/100

## 2022-10-13 ASSESSMENT — TONOMETRY
OD_IOP_MMHG: 11
OS_IOP_MMHG: 12

## 2023-01-12 ENCOUNTER — COMPREHENSIVE EXAM (OUTPATIENT)
Dept: URBAN - METROPOLITAN AREA CLINIC 35 | Facility: CLINIC | Age: 83
End: 2023-01-12

## 2023-01-12 DIAGNOSIS — H40.013: ICD-10-CM

## 2023-01-12 DIAGNOSIS — H35.033: ICD-10-CM

## 2023-01-12 DIAGNOSIS — H35.731: ICD-10-CM

## 2023-01-12 DIAGNOSIS — H35.363: ICD-10-CM

## 2023-01-12 DIAGNOSIS — H35.3212: ICD-10-CM

## 2023-01-12 DIAGNOSIS — H43.813: ICD-10-CM

## 2023-01-12 DIAGNOSIS — H25.811: ICD-10-CM

## 2023-01-12 DIAGNOSIS — H35.30: ICD-10-CM

## 2023-01-12 DIAGNOSIS — H35.3122: ICD-10-CM

## 2023-01-12 DIAGNOSIS — H35.722: ICD-10-CM

## 2023-01-12 DIAGNOSIS — Z79.899: ICD-10-CM

## 2023-01-12 PROCEDURE — 92250 FUNDUS PHOTOGRAPHY W/I&R: CPT

## 2023-01-12 PROCEDURE — 99214 OFFICE O/P EST MOD 30 MIN: CPT

## 2023-01-12 ASSESSMENT — VISUAL ACUITY
OS_CC: 20/40-2
OD_CC: 20/70-1

## 2023-01-12 ASSESSMENT — TONOMETRY
OD_IOP_MMHG: 13
OS_IOP_MMHG: 13

## 2023-04-11 ENCOUNTER — COMPREHENSIVE EXAM (OUTPATIENT)
Dept: URBAN - METROPOLITAN AREA CLINIC 35 | Facility: CLINIC | Age: 83
End: 2023-04-11

## 2023-04-11 DIAGNOSIS — H52.03: ICD-10-CM

## 2023-04-11 DIAGNOSIS — H35.363: ICD-10-CM

## 2023-04-11 DIAGNOSIS — Z79.899: ICD-10-CM

## 2023-04-11 DIAGNOSIS — H35.033: ICD-10-CM

## 2023-04-11 DIAGNOSIS — H40.013: ICD-10-CM

## 2023-04-11 DIAGNOSIS — H35.30: ICD-10-CM

## 2023-04-11 DIAGNOSIS — H35.731: ICD-10-CM

## 2023-04-11 DIAGNOSIS — H25.811: ICD-10-CM

## 2023-04-11 DIAGNOSIS — H35.3212: ICD-10-CM

## 2023-04-11 DIAGNOSIS — H35.3122: ICD-10-CM

## 2023-04-11 DIAGNOSIS — H35.722: ICD-10-CM

## 2023-04-11 DIAGNOSIS — H43.813: ICD-10-CM

## 2023-04-11 PROCEDURE — 92014 COMPRE OPH EXAM EST PT 1/>: CPT

## 2023-04-11 PROCEDURE — 92015 DETERMINE REFRACTIVE STATE: CPT

## 2023-04-11 ASSESSMENT — VISUAL ACUITY
OU_CC: 20/40+2
OU_CC: J8
OD_CC: 20/70
OU_SC: 20/100-1
OS_CC: J10
OD_SC: 20/400
OD_CC: J12<
OS_SC: 20/200
OS_CC: 20/40-2

## 2023-04-11 ASSESSMENT — KERATOMETRY
OS_K1POWER_DIOPTERS: 43.50
OS_AXISANGLE_DEGREES: 85
OD_K2POWER_DIOPTERS: 45.00
OS_AXISANGLE2_DEGREES: 175
OD_K1POWER_DIOPTERS: 43.25
OS_K2POWER_DIOPTERS: 45.75
OD_AXISANGLE_DEGREES: 100
OD_AXISANGLE2_DEGREES: 10

## 2023-04-11 ASSESSMENT — TONOMETRY
OS_IOP_MMHG: 14
OD_IOP_MMHG: 13

## 2023-07-27 ENCOUNTER — ESTABLISHED PATIENT (OUTPATIENT)
Dept: URBAN - METROPOLITAN AREA CLINIC 35 | Facility: CLINIC | Age: 83
End: 2023-07-27

## 2023-07-27 DIAGNOSIS — H35.731: ICD-10-CM

## 2023-07-27 DIAGNOSIS — H43.813: ICD-10-CM

## 2023-07-27 DIAGNOSIS — H35.033: ICD-10-CM

## 2023-07-27 DIAGNOSIS — H35.3122: ICD-10-CM

## 2023-07-27 DIAGNOSIS — H35.363: ICD-10-CM

## 2023-07-27 DIAGNOSIS — Z79.899: ICD-10-CM

## 2023-07-27 DIAGNOSIS — H35.3212: ICD-10-CM

## 2023-07-27 DIAGNOSIS — H35.722: ICD-10-CM

## 2023-07-27 DIAGNOSIS — H35.30: ICD-10-CM

## 2023-07-27 DIAGNOSIS — H40.013: ICD-10-CM

## 2023-07-27 PROCEDURE — 92250 FUNDUS PHOTOGRAPHY W/I&R: CPT

## 2023-07-27 PROCEDURE — 99214 OFFICE O/P EST MOD 30 MIN: CPT

## 2023-07-27 PROCEDURE — 92273 FULL FIELD ERG W/I&R: CPT

## 2023-07-27 ASSESSMENT — TONOMETRY
OD_IOP_MMHG: 13
OS_IOP_MMHG: 13

## 2023-07-27 ASSESSMENT — VISUAL ACUITY
OS_CC: 20/40-2
OD_CC: 20/80-1

## 2024-02-14 ENCOUNTER — ESTABLISHED PATIENT (OUTPATIENT)
Dept: URBAN - METROPOLITAN AREA CLINIC 35 | Facility: CLINIC | Age: 84
End: 2024-02-14

## 2024-02-14 DIAGNOSIS — H35.363: ICD-10-CM

## 2024-02-14 DIAGNOSIS — H35.731: ICD-10-CM

## 2024-02-14 DIAGNOSIS — H35.3122: ICD-10-CM

## 2024-02-14 DIAGNOSIS — H35.722: ICD-10-CM

## 2024-02-14 DIAGNOSIS — H52.03: ICD-10-CM

## 2024-02-14 DIAGNOSIS — H35.30: ICD-10-CM

## 2024-02-14 DIAGNOSIS — Z79.899: ICD-10-CM

## 2024-02-14 DIAGNOSIS — H52.4: ICD-10-CM

## 2024-02-14 DIAGNOSIS — H35.033: ICD-10-CM

## 2024-02-14 DIAGNOSIS — H35.3212: ICD-10-CM

## 2024-02-14 DIAGNOSIS — H40.013: ICD-10-CM

## 2024-02-14 DIAGNOSIS — H43.813: ICD-10-CM

## 2024-02-14 PROCEDURE — 92250 FUNDUS PHOTOGRAPHY W/I&R: CPT

## 2024-02-14 PROCEDURE — 92015 DETERMINE REFRACTIVE STATE: CPT

## 2024-02-14 PROCEDURE — 92014 COMPRE OPH EXAM EST PT 1/>: CPT

## 2024-02-14 PROCEDURE — 92133 CPTRZD OPH DX IMG PST SGM ON: CPT

## 2024-02-14 ASSESSMENT — VISUAL ACUITY
OD_CC: 20/400 BLURRY
OD_CC: J12<
OU_CC: 20/40
OU_CC: J6
OS_CC: J6
OS_CC: 20/40-1

## 2024-02-14 ASSESSMENT — TONOMETRY
OS_IOP_MMHG: 12
OD_IOP_MMHG: 12

## 2024-02-22 ENCOUNTER — FOLLOW UP (OUTPATIENT)
Dept: URBAN - METROPOLITAN AREA CLINIC 35 | Facility: CLINIC | Age: 84
End: 2024-02-22

## 2024-02-22 DIAGNOSIS — H40.013: ICD-10-CM

## 2024-02-22 DIAGNOSIS — H35.3114: ICD-10-CM

## 2024-02-22 DIAGNOSIS — H35.3122: ICD-10-CM

## 2024-02-22 DIAGNOSIS — H35.033: ICD-10-CM

## 2024-02-22 DIAGNOSIS — H35.3212: ICD-10-CM

## 2024-02-22 DIAGNOSIS — H35.722: ICD-10-CM

## 2024-02-22 DIAGNOSIS — H35.731: ICD-10-CM

## 2024-02-22 DIAGNOSIS — H35.363: ICD-10-CM

## 2024-02-22 DIAGNOSIS — H35.30: ICD-10-CM

## 2024-02-22 PROCEDURE — 99214 OFFICE O/P EST MOD 30 MIN: CPT

## 2024-02-22 PROCEDURE — 92250 FUNDUS PHOTOGRAPHY W/I&R: CPT

## 2024-02-22 ASSESSMENT — TONOMETRY
OS_IOP_MMHG: 14
OD_IOP_MMHG: 10

## 2024-02-22 ASSESSMENT — VISUAL ACUITY
OD_CC: 20/400
OS_CC: 20/50-1

## 2025-02-12 ENCOUNTER — COMPREHENSIVE EXAM (OUTPATIENT)
Age: 85
End: 2025-02-12

## 2025-02-12 DIAGNOSIS — H35.30: ICD-10-CM

## 2025-02-12 DIAGNOSIS — H40.013: ICD-10-CM

## 2025-02-12 DIAGNOSIS — H52.03: ICD-10-CM

## 2025-02-12 DIAGNOSIS — H35.3212: ICD-10-CM

## 2025-02-12 DIAGNOSIS — H35.3122: ICD-10-CM

## 2025-02-12 DIAGNOSIS — H35.033: ICD-10-CM

## 2025-02-12 DIAGNOSIS — H35.3114: ICD-10-CM

## 2025-02-12 DIAGNOSIS — H35.363: ICD-10-CM

## 2025-02-12 DIAGNOSIS — E11.9: ICD-10-CM

## 2025-02-12 DIAGNOSIS — H43.813: ICD-10-CM

## 2025-02-12 DIAGNOSIS — H52.4: ICD-10-CM

## 2025-02-12 PROCEDURE — 92015 DETERMINE REFRACTIVE STATE: CPT

## 2025-02-12 PROCEDURE — 92133 CPTRZD OPH DX IMG PST SGM ON: CPT

## 2025-02-12 PROCEDURE — 99214 OFFICE O/P EST MOD 30 MIN: CPT
